# Patient Record
Sex: FEMALE | URBAN - METROPOLITAN AREA
[De-identification: names, ages, dates, MRNs, and addresses within clinical notes are randomized per-mention and may not be internally consistent; named-entity substitution may affect disease eponyms.]

---

## 2019-11-11 ENCOUNTER — TELEPHONE (OUTPATIENT)
Dept: ONCOLOGY | Facility: CLINIC | Age: 49
End: 2019-11-11

## 2019-11-12 NOTE — TELEPHONE ENCOUNTER
Called referring clinic for referral clarity.    New Patient Referral received a lab result from 5/26/10 from [facility], laura Pritchettgirish Gurpreet.    These results didn't contain a referral request, or patient contact information.  Nor is this an active patient in our system.    Results are saved on the [address] waiting for [facility]'s direction on the intent of the fax.